# Patient Record
Sex: FEMALE | Race: BLACK OR AFRICAN AMERICAN | Employment: OTHER | URBAN - METROPOLITAN AREA
[De-identification: names, ages, dates, MRNs, and addresses within clinical notes are randomized per-mention and may not be internally consistent; named-entity substitution may affect disease eponyms.]

---

## 2019-06-20 ENCOUNTER — HOSPITAL ENCOUNTER (EMERGENCY)
Age: 29
Discharge: HOME | End: 2019-06-20
Attending: EMERGENCY MEDICINE
Payer: COMMERCIAL

## 2019-06-20 VITALS
TEMPERATURE: 98.7 F | HEIGHT: 72 IN | DIASTOLIC BLOOD PRESSURE: 71 MMHG | SYSTOLIC BLOOD PRESSURE: 106 MMHG | HEART RATE: 81 BPM | BODY MASS INDEX: 25.73 KG/M2 | OXYGEN SATURATION: 100 % | WEIGHT: 190 LBS

## 2019-06-20 DIAGNOSIS — R42 VERTIGO: ICD-10-CM

## 2019-06-20 DIAGNOSIS — J01.00 ACUTE MAXILLARY SINUSITIS, RECURRENCE NOT SPECIFIED: Primary | ICD-10-CM

## 2019-06-20 PROCEDURE — 99283 EMERGENCY DEPT VISIT LOW MDM: CPT

## 2019-06-20 RX ORDER — CEFUROXIME AXETIL 250 MG/1
250 TABLET ORAL 2 TIMES DAILY
Qty: 14 TABLET | Refills: 0 | Status: SHIPPED | OUTPATIENT
Start: 2019-06-20 | End: 2019-06-27

## 2019-06-20 RX ORDER — MECLIZINE HYDROCHLORIDE 25 MG/1
25 TABLET ORAL 4 TIMES DAILY PRN
Qty: 20 TABLET | Refills: 0 | Status: SHIPPED | OUTPATIENT
Start: 2019-06-20

## 2019-06-20 RX ORDER — FLUTICASONE PROPIONATE 50 MCG
1 SPRAY, SUSPENSION (ML) NASAL 2 TIMES DAILY
Qty: 1 BOTTLE | Refills: 0 | Status: SHIPPED | OUTPATIENT
Start: 2019-06-20

## 2019-06-20 ASSESSMENT — ENCOUNTER SYMPTOMS
RHINORRHEA: 1
SINUS PRESSURE: 1
SORE THROAT: 0
SINUS PAIN: 1
VOMITING: 0
DIARRHEA: 0
FACIAL SWELLING: 0
SHORTNESS OF BREATH: 0
NAUSEA: 1
COUGH: 0

## 2019-06-20 NOTE — ED PROVIDER NOTES
(ANTIVERT) 25 MG tablet     Sig: Take 1 tablet by mouth 4 times daily as needed for Dizziness     Dispense:  20 tablet     Refill:  0       CONSULTS:  None    MEDICAL DECISIONMAKING / ASSESSMENT / PLAN     Jarrett Cheung is a 25 y.o. female presenting with symptoms of dizziness consistent with vertigo in the setting of sinusitis which has been present for 3 weeks and not improving. The patient is afebrile here and has normal vital signs. She has sinus tenderness as well as now 3 weeks of symptoms which would warrant antibiotics. Vertigo would be consistent with this diagnosis and her symptoms are positional.  She will be started on antibiotic, nasal steroid, and meclizine. I feel she is stable for discharge home and does not need a work-up for the symptoms. Clinical Impression     1. Acute maxillary sinusitis, recurrence not specified    2.  Vertigo        Disposition     PATIENT REFERRED TO:  Sarmad Clarke MD  [address] [address]  [phone]    In 1 week  if not improving      DISCHARGE MEDICATIONS:  New Prescriptions    CEFUROXIME (CEFTIN) 250 MG TABLET    Take 1 tablet by mouth 2 times daily for 7 days    FLUTICASONE (FLONASE) 50 MCG/ACT NASAL SPRAY    1 spray by Nasal route 2 times daily    MECLIZINE (ANTIVERT) 25 MG TABLET    Take 1 tablet by mouth 4 times daily as needed for Dizziness       DISPOSITION Decision To Discharge 06/20/2019 07:46:20 PM       Kuldip Ford MD  06/20/19 [id number]